# Patient Record
Sex: FEMALE | Race: WHITE
[De-identification: names, ages, dates, MRNs, and addresses within clinical notes are randomized per-mention and may not be internally consistent; named-entity substitution may affect disease eponyms.]

---

## 2021-12-08 ENCOUNTER — HOSPITAL ENCOUNTER (OUTPATIENT)
Dept: HOSPITAL 35 - PAIN | Age: 61
End: 2021-12-08
Payer: COMMERCIAL

## 2021-12-08 VITALS — DIASTOLIC BLOOD PRESSURE: 75 MMHG | SYSTOLIC BLOOD PRESSURE: 154 MMHG

## 2021-12-08 VITALS — HEIGHT: 67.01 IN | BODY MASS INDEX: 31.55 KG/M2 | WEIGHT: 201 LBS

## 2021-12-08 DIAGNOSIS — Z88.8: ICD-10-CM

## 2021-12-08 DIAGNOSIS — E03.9: ICD-10-CM

## 2021-12-08 DIAGNOSIS — K21.9: ICD-10-CM

## 2021-12-08 DIAGNOSIS — M54.50: ICD-10-CM

## 2021-12-08 DIAGNOSIS — E78.5: ICD-10-CM

## 2021-12-08 DIAGNOSIS — Z79.899: ICD-10-CM

## 2021-12-08 DIAGNOSIS — D05.11: Primary | ICD-10-CM

## 2021-12-08 DIAGNOSIS — B00.89: ICD-10-CM

## 2021-12-08 DIAGNOSIS — M54.17: ICD-10-CM

## 2021-12-08 DIAGNOSIS — J45.909: ICD-10-CM

## 2021-12-08 NOTE — NUR
Pain Clinic Assessment:
 
1. History of Osteoarthritis:
Not Applicable
   History of Rheumatoid Arthritis:
Not Applicable
 
2. Height: 5 ft. 7 in. 170.2 cm.
   Weight: 201.0 lb.  oz. 91.173 kg.
   Patient's BMI: 31.5
 
3. Vital Signs:
   BP: 154/75 Pulse: 85 Resp: 16
   Temp:  02 Sat: 98 ECG Mon:
 
4. Pain Intensity: 5
 
5. Fall Risk:
   Dizziness: Y  Needs help standing or walking: N
   Fallen in the last 3 months: N
   Fall risk comments:
 
 
6. Patient on Blood Thinner: None
 
7. History of Hypertension: N
 
8. Opioid Therapy greater than 6 weeks:
   Opiate Contract Signed:
 
9. Risk Assessment Tool Provided: 0 LOW RISK
 
10. Functional Assessment Tool: 39/70
 
11. Recreational Drug Use: Never Drug Type:
    Tobacco Use: Never Smoker Tobacco Type:
       Amount or Packs/day:  How Many Years:
    Alcohol Use: No  Frequency:  Quant:

## 2021-12-29 ENCOUNTER — HOSPITAL ENCOUNTER (OUTPATIENT)
Dept: HOSPITAL 35 - PAIN | Age: 61
Discharge: HOME | End: 2021-12-29
Payer: COMMERCIAL

## 2021-12-29 VITALS — BODY MASS INDEX: 31.42 KG/M2 | HEIGHT: 67.01 IN | WEIGHT: 200.2 LBS

## 2021-12-29 VITALS — DIASTOLIC BLOOD PRESSURE: 92 MMHG | SYSTOLIC BLOOD PRESSURE: 127 MMHG

## 2021-12-29 DIAGNOSIS — Z98.890: ICD-10-CM

## 2021-12-29 DIAGNOSIS — E78.5: ICD-10-CM

## 2021-12-29 DIAGNOSIS — Z88.8: ICD-10-CM

## 2021-12-29 DIAGNOSIS — Z87.19: ICD-10-CM

## 2021-12-29 DIAGNOSIS — Z79.899: ICD-10-CM

## 2021-12-29 DIAGNOSIS — K21.9: ICD-10-CM

## 2021-12-29 DIAGNOSIS — G89.29: ICD-10-CM

## 2021-12-29 DIAGNOSIS — E03.9: ICD-10-CM

## 2021-12-29 DIAGNOSIS — M54.16: Primary | ICD-10-CM

## 2021-12-29 DIAGNOSIS — J45.909: ICD-10-CM

## 2021-12-29 NOTE — NUR
Pain Clinic Assessment:
 
1. History of Osteoarthritis:
Not Applicable
   History of Rheumatoid Arthritis:
Not Applicable
 
2. Height: 5 ft. 7 in. 170.2 cm.
   Weight: 200.2 lb.  oz. 90.810 kg.
   Patient's BMI: 31.3
 
3. Vital Signs:
   BP: 127/92 Pulse: 86 Resp: 20
   Temp:  02 Sat: 96 ECG Mon:
 
4. Pain Intensity: 5
 
5. Fall Risk:
   Dizziness: N  Needs help standing or walking: N
   Fallen in the last 3 months: N
   Fall risk comments:
 
 
6. Patient on Blood Thinner: None
 
7. History of Hypertension: N
 
8. Opioid Therapy greater than 6 weeks:
   Opiate Contract Signed:
 
9. Risk Assessment Tool Provided: 0 LOW RISK
 
10. Functional Assessment Tool: 39/70
 
11. Recreational Drug Use: Never Drug Type:
    Tobacco Use: Never Smoker Tobacco Type:
       Amount or Packs/day:  How Many Years:
    Alcohol Use: No  Frequency:  Quant: